# Patient Record
Sex: FEMALE | Race: WHITE | NOT HISPANIC OR LATINO
[De-identification: names, ages, dates, MRNs, and addresses within clinical notes are randomized per-mention and may not be internally consistent; named-entity substitution may affect disease eponyms.]

---

## 2022-03-04 PROBLEM — Z00.00 ENCOUNTER FOR PREVENTIVE HEALTH EXAMINATION: Status: ACTIVE | Noted: 2022-03-04

## 2022-03-07 ENCOUNTER — APPOINTMENT (OUTPATIENT)
Dept: GASTROENTEROLOGY | Facility: CLINIC | Age: 57
End: 2022-03-07
Payer: COMMERCIAL

## 2022-03-07 VITALS
HEIGHT: 67 IN | BODY MASS INDEX: 21.66 KG/M2 | DIASTOLIC BLOOD PRESSURE: 80 MMHG | SYSTOLIC BLOOD PRESSURE: 130 MMHG | OXYGEN SATURATION: 99 % | TEMPERATURE: 97.7 F | HEART RATE: 91 BPM | WEIGHT: 138 LBS

## 2022-03-07 DIAGNOSIS — Z80.0 ENCOUNTER FOR SCREENING FOR MALIGNANT NEOPLASM OF COLON: ICD-10-CM

## 2022-03-07 DIAGNOSIS — Z12.11 ENCOUNTER FOR SCREENING FOR MALIGNANT NEOPLASM OF COLON: ICD-10-CM

## 2022-03-07 DIAGNOSIS — K21.9 GASTRO-ESOPHAGEAL REFLUX DISEASE W/OUT ESOPHAGITIS: ICD-10-CM

## 2022-03-07 PROCEDURE — 99202 OFFICE O/P NEW SF 15 MIN: CPT

## 2022-03-07 RX ORDER — CYCLOSPORINE 0.5 MG/ML
0.05 EMULSION OPHTHALMIC
Qty: 180 | Refills: 0 | Status: ACTIVE | COMMUNITY
Start: 2021-10-22

## 2022-03-07 RX ORDER — PAROXETINE HYDROCHLORIDE 10 MG/1
10 TABLET, FILM COATED ORAL
Qty: 90 | Refills: 0 | Status: ACTIVE | COMMUNITY
Start: 2021-05-18

## 2022-03-07 NOTE — ASSESSMENT
[FreeTextEntry1] : The patient is a 56-year-old female who enjoys good health.  The patient is due for repeat colorectal cancer screening due to a family history of colon cancer and a personal history of polyps.  The patient does also have a history of intermittent episodes of noncardiac chest discomfort and uses famotidine as needed.  We will perform an upper endoscopy at the same time to assess a hiatus hernia and to assure that there is no occult acid damage which would require a change in her medication regimen.  Once the procedures are performed I will notify the patient of the results and decide if further intervention is needed.

## 2022-03-07 NOTE — HISTORY OF PRESENT ILLNESS
[FreeTextEntry1] : I saw your patient Narcisa Gongora in the office today.  The patient is a 56-year-old female who enjoys good health.  She has no history of hypertension diabetes or coronary artery disease and her appetite is good with no dysphagia or unexplained weight loss.  Patient's bowel movements are usually normal with no blood in the stool or on the toilet tissue although she does notice some increased flatulence.  The patient notices intermittent episodes of reflux which manifests itself is some pressure in the chest area.  This is not cardiac in nature and is relieved with famotidine or antacids.  The patient has 1 to 2 cups of caffeine a day rarely has ethanol and does not smoke.  The patient does have a family history of colon cancer and has had adenomatous polyps in the past.  Patient is due for repeat colonoscopy.  The patient is also had an upper endoscopy in the past and has a hiatus hernia.

## 2022-04-21 DIAGNOSIS — Z01.818 ENCOUNTER FOR OTHER PREPROCEDURAL EXAMINATION: ICD-10-CM

## 2022-04-21 DIAGNOSIS — Z11.52 ENCOUNTER FOR SCREENING FOR COVID-19: ICD-10-CM

## 2022-05-24 ENCOUNTER — APPOINTMENT (OUTPATIENT)
Dept: GASTROENTEROLOGY | Facility: AMBULATORY MEDICAL SERVICES | Age: 57
End: 2022-05-24
Payer: COMMERCIAL

## 2022-05-24 PROCEDURE — 45380 COLONOSCOPY AND BIOPSY: CPT | Mod: 33

## 2022-05-24 PROCEDURE — 43239 EGD BIOPSY SINGLE/MULTIPLE: CPT | Mod: 59

## 2023-04-13 ENCOUNTER — RX RENEWAL (OUTPATIENT)
Age: 58
End: 2023-04-13

## 2023-04-13 RX ORDER — FAMOTIDINE 20 MG/1
20 TABLET, FILM COATED ORAL DAILY
Qty: 90 | Refills: 3 | Status: ACTIVE | COMMUNITY
Start: 2022-03-07 | End: 1900-01-01